# Patient Record
Sex: FEMALE | Race: ASIAN | Employment: UNEMPLOYED | URBAN - METROPOLITAN AREA
[De-identification: names, ages, dates, MRNs, and addresses within clinical notes are randomized per-mention and may not be internally consistent; named-entity substitution may affect disease eponyms.]

---

## 2020-09-22 ENCOUNTER — APPOINTMENT (OUTPATIENT)
Dept: GENERAL RADIOLOGY | Age: 30
End: 2020-09-22
Attending: EMERGENCY MEDICINE
Payer: COMMERCIAL

## 2020-09-22 ENCOUNTER — HOSPITAL ENCOUNTER (OUTPATIENT)
Age: 30
Setting detail: OBSERVATION
Discharge: HOME | End: 2020-09-23
Attending: EMERGENCY MEDICINE | Admitting: FAMILY MEDICINE
Payer: COMMERCIAL

## 2020-09-22 ENCOUNTER — APPOINTMENT (OUTPATIENT)
Dept: CT IMAGING | Age: 30
End: 2020-09-22
Attending: EMERGENCY MEDICINE
Payer: COMMERCIAL

## 2020-09-22 DIAGNOSIS — R50.9 FEVER, UNSPECIFIED FEVER CAUSE: ICD-10-CM

## 2020-09-22 DIAGNOSIS — R61 NIGHT SWEATS: ICD-10-CM

## 2020-09-22 DIAGNOSIS — R51.9 NONINTRACTABLE HEADACHE, UNSPECIFIED CHRONICITY PATTERN, UNSPECIFIED HEADACHE TYPE: ICD-10-CM

## 2020-09-22 DIAGNOSIS — D72.829 LEUKOCYTOSIS, UNSPECIFIED TYPE: ICD-10-CM

## 2020-09-22 DIAGNOSIS — R79.82 CRP ELEVATED: ICD-10-CM

## 2020-09-22 DIAGNOSIS — R00.0 SINUS TACHYCARDIA: Primary | ICD-10-CM

## 2020-09-22 DIAGNOSIS — M54.50 LOW BACK PAIN, UNSPECIFIED BACK PAIN LATERALITY, UNSPECIFIED CHRONICITY, UNSPECIFIED WHETHER SCIATICA PRESENT: ICD-10-CM

## 2020-09-22 LAB
ALBUMIN SERPL-MCNC: 3.6 G/DL (ref 3.5–5)
ALBUMIN/GLOB SERPL: 0.7 {RATIO} (ref 1.1–2.2)
ALP SERPL-CCNC: 120 U/L (ref 45–117)
ALT SERPL-CCNC: 22 U/L (ref 12–78)
ANION GAP SERPL CALC-SCNC: 12 MMOL/L (ref 5–15)
APPEARANCE UR: CLEAR
AST SERPL-CCNC: 17 U/L (ref 15–37)
BACTERIA URNS QL MICRO: NEGATIVE /HPF
BASOPHILS # BLD: 0.1 K/UL (ref 0–0.1)
BASOPHILS NFR BLD: 0 % (ref 0–1)
BILIRUB SERPL-MCNC: 0.7 MG/DL (ref 0.2–1)
BILIRUB UR QL: NEGATIVE
BUN SERPL-MCNC: 5 MG/DL (ref 6–20)
BUN/CREAT SERPL: 6 (ref 12–20)
CALCIUM SERPL-MCNC: 9.1 MG/DL (ref 8.5–10.1)
CHLORIDE SERPL-SCNC: 97 MMOL/L (ref 97–108)
CO2 SERPL-SCNC: 25 MMOL/L (ref 21–32)
COLOR UR: ABNORMAL
COMMENT, HOLDF: NORMAL
CREAT SERPL-MCNC: 0.78 MG/DL (ref 0.55–1.02)
CRP SERPL-MCNC: 53.01 MG/DL
D DIMER PPP FEU-MCNC: 0.48 MG/L FEU (ref 0–0.65)
DIFFERENTIAL METHOD BLD: ABNORMAL
EOSINOPHIL # BLD: 0 K/UL (ref 0–0.4)
EOSINOPHIL NFR BLD: 0 % (ref 0–7)
EPITH CASTS URNS QL MICRO: ABNORMAL /LPF
ERYTHROCYTE [DISTWIDTH] IN BLOOD BY AUTOMATED COUNT: 13.3 % (ref 11.5–14.5)
FERRITIN SERPL-MCNC: 81 NG/ML (ref 8–252)
FIBRINOGEN PPP-MCNC: 670 MG/DL (ref 200–475)
GLOBULIN SER CALC-MCNC: 4.9 G/DL (ref 2–4)
GLUCOSE SERPL-MCNC: 113 MG/DL (ref 65–100)
GLUCOSE UR STRIP.AUTO-MCNC: NEGATIVE MG/DL
HCG UR QL: NEGATIVE
HCT VFR BLD AUTO: 38.1 % (ref 35–47)
HGB BLD-MCNC: 12.7 G/DL (ref 11.5–16)
HGB UR QL STRIP: ABNORMAL
IMM GRANULOCYTES # BLD AUTO: 0.1 K/UL (ref 0–0.04)
IMM GRANULOCYTES NFR BLD AUTO: 1 % (ref 0–0.5)
KETONES UR QL STRIP.AUTO: NEGATIVE MG/DL
LACTATE SERPL-SCNC: 1 MMOL/L (ref 0.4–2)
LDH SERPL L TO P-CCNC: 166 U/L (ref 81–246)
LEUKOCYTE ESTERASE UR QL STRIP.AUTO: NEGATIVE
LYMPHOCYTES # BLD: 2.9 K/UL (ref 0.8–3.5)
LYMPHOCYTES NFR BLD: 21 % (ref 12–49)
MAGNESIUM SERPL-MCNC: 2 MG/DL (ref 1.6–2.4)
MCH RBC QN AUTO: 25.2 PG (ref 26–34)
MCHC RBC AUTO-ENTMCNC: 33.3 G/DL (ref 30–36.5)
MCV RBC AUTO: 75.6 FL (ref 80–99)
MONOCYTES # BLD: 2 K/UL (ref 0–1)
MONOCYTES NFR BLD: 14 % (ref 5–13)
NEUTS SEG # BLD: 9 K/UL (ref 1.8–8)
NEUTS SEG NFR BLD: 64 % (ref 32–75)
NITRITE UR QL STRIP.AUTO: NEGATIVE
NRBC # BLD: 0 K/UL (ref 0–0.01)
NRBC BLD-RTO: 0 PER 100 WBC
PH UR STRIP: 6.5 [PH] (ref 5–8)
PLATELET # BLD AUTO: 255 K/UL (ref 150–400)
PMV BLD AUTO: 10.8 FL (ref 8.9–12.9)
POTASSIUM SERPL-SCNC: 3.5 MMOL/L (ref 3.5–5.1)
PROCALCITONIN SERPL-MCNC: 0.13 NG/ML
PROT SERPL-MCNC: 8.5 G/DL (ref 6.4–8.2)
PROT UR STRIP-MCNC: NEGATIVE MG/DL
RBC # BLD AUTO: 5.04 M/UL (ref 3.8–5.2)
RBC #/AREA URNS HPF: ABNORMAL /HPF (ref 0–5)
SAMPLES BEING HELD,HOLD: NORMAL
SODIUM SERPL-SCNC: 134 MMOL/L (ref 136–145)
SP GR UR REFRACTOMETRY: <1.005 (ref 1–1.03)
TSH SERPL DL<=0.05 MIU/L-ACNC: 0.86 UIU/ML (ref 0.36–3.74)
UROBILINOGEN UR QL STRIP.AUTO: 0.2 EU/DL (ref 0.2–1)
WBC # BLD AUTO: 14 K/UL (ref 3.6–11)
WBC URNS QL MICRO: ABNORMAL /HPF (ref 0–4)

## 2020-09-22 PROCEDURE — 81001 URINALYSIS AUTO W/SCOPE: CPT

## 2020-09-22 PROCEDURE — 36415 COLL VENOUS BLD VENIPUNCTURE: CPT

## 2020-09-22 PROCEDURE — 85384 FIBRINOGEN ACTIVITY: CPT

## 2020-09-22 PROCEDURE — 87040 BLOOD CULTURE FOR BACTERIA: CPT

## 2020-09-22 PROCEDURE — 87635 SARS-COV-2 COVID-19 AMP PRB: CPT

## 2020-09-22 PROCEDURE — 81025 URINE PREGNANCY TEST: CPT

## 2020-09-22 PROCEDURE — 74011250636 HC RX REV CODE- 250/636: Performed by: INTERNAL MEDICINE

## 2020-09-22 PROCEDURE — 82728 ASSAY OF FERRITIN: CPT

## 2020-09-22 PROCEDURE — 96365 THER/PROPH/DIAG IV INF INIT: CPT

## 2020-09-22 PROCEDURE — 99285 EMERGENCY DEPT VISIT HI MDM: CPT

## 2020-09-22 PROCEDURE — 80053 COMPREHEN METABOLIC PANEL: CPT

## 2020-09-22 PROCEDURE — 83615 LACTATE (LD) (LDH) ENZYME: CPT

## 2020-09-22 PROCEDURE — 71046 X-RAY EXAM CHEST 2 VIEWS: CPT

## 2020-09-22 PROCEDURE — 83735 ASSAY OF MAGNESIUM: CPT

## 2020-09-22 PROCEDURE — 84145 PROCALCITONIN (PCT): CPT

## 2020-09-22 PROCEDURE — 85025 COMPLETE CBC W/AUTO DIFF WBC: CPT

## 2020-09-22 PROCEDURE — 74011000258 HC RX REV CODE- 258: Performed by: EMERGENCY MEDICINE

## 2020-09-22 PROCEDURE — 93005 ELECTROCARDIOGRAM TRACING: CPT

## 2020-09-22 PROCEDURE — 99218 HC RM OBSERVATION: CPT

## 2020-09-22 PROCEDURE — 74011250637 HC RX REV CODE- 250/637: Performed by: EMERGENCY MEDICINE

## 2020-09-22 PROCEDURE — 86140 C-REACTIVE PROTEIN: CPT

## 2020-09-22 PROCEDURE — 83605 ASSAY OF LACTIC ACID: CPT

## 2020-09-22 PROCEDURE — 84443 ASSAY THYROID STIM HORMONE: CPT

## 2020-09-22 PROCEDURE — 74011250636 HC RX REV CODE- 250/636: Performed by: EMERGENCY MEDICINE

## 2020-09-22 PROCEDURE — 0100U RESPIRATORY PANEL,PCR,NASOPHARYNGEAL: CPT

## 2020-09-22 PROCEDURE — 70450 CT HEAD/BRAIN W/O DYE: CPT

## 2020-09-22 PROCEDURE — 96368 THER/DIAG CONCURRENT INF: CPT

## 2020-09-22 PROCEDURE — 96366 THER/PROPH/DIAG IV INF ADDON: CPT

## 2020-09-22 PROCEDURE — 85379 FIBRIN DEGRADATION QUANT: CPT

## 2020-09-22 RX ORDER — ACETAMINOPHEN 650 MG/1
650 SUPPOSITORY RECTAL
Status: DISCONTINUED | OUTPATIENT
Start: 2020-09-22 | End: 2020-09-23 | Stop reason: HOSPADM

## 2020-09-22 RX ORDER — SODIUM CHLORIDE 9 MG/ML
100 INJECTION, SOLUTION INTRAVENOUS CONTINUOUS
Status: DISCONTINUED | OUTPATIENT
Start: 2020-09-22 | End: 2020-09-23

## 2020-09-22 RX ORDER — ACETAMINOPHEN 325 MG/1
650 TABLET ORAL
Status: DISCONTINUED | OUTPATIENT
Start: 2020-09-22 | End: 2020-09-23 | Stop reason: HOSPADM

## 2020-09-22 RX ORDER — IBUPROFEN 600 MG/1
600 TABLET ORAL
Status: COMPLETED | OUTPATIENT
Start: 2020-09-22 | End: 2020-09-22

## 2020-09-22 RX ORDER — ENOXAPARIN SODIUM 100 MG/ML
30 INJECTION SUBCUTANEOUS EVERY 12 HOURS
Status: DISCONTINUED | OUTPATIENT
Start: 2020-09-22 | End: 2020-09-22

## 2020-09-22 RX ORDER — ACETAMINOPHEN 500 MG
1000 TABLET ORAL ONCE
Status: DISCONTINUED | OUTPATIENT
Start: 2020-09-22 | End: 2020-09-22

## 2020-09-22 RX ORDER — SODIUM CHLORIDE 0.9 % (FLUSH) 0.9 %
5-10 SYRINGE (ML) INJECTION AS NEEDED
Status: DISCONTINUED | OUTPATIENT
Start: 2020-09-22 | End: 2020-09-23 | Stop reason: HOSPADM

## 2020-09-22 RX ADMIN — IBUPROFEN 600 MG: 600 TABLET, FILM COATED ORAL at 16:26

## 2020-09-22 RX ADMIN — PIPERACILLIN AND TAZOBACTAM 3.38 G: 3; .375 INJECTION, POWDER, LYOPHILIZED, FOR SOLUTION INTRAVENOUS at 16:21

## 2020-09-22 RX ADMIN — VANCOMYCIN HYDROCHLORIDE 1000 MG: 1 INJECTION, POWDER, LYOPHILIZED, FOR SOLUTION INTRAVENOUS at 16:55

## 2020-09-22 RX ADMIN — SODIUM CHLORIDE 1000 ML: 900 INJECTION, SOLUTION INTRAVENOUS at 13:14

## 2020-09-22 RX ADMIN — SODIUM CHLORIDE 1000 ML: 900 INJECTION, SOLUTION INTRAVENOUS at 13:13

## 2020-09-22 RX ADMIN — SODIUM CHLORIDE 100 ML/HR: 9 INJECTION, SOLUTION INTRAVENOUS at 23:46

## 2020-09-22 NOTE — ED PROVIDER NOTES
Normally healthy 26-year-old female had her flu shot a few days ago and has felt somewhat feverish and has some night sweats ever since. She had low back pain a few days ago, but that went away after taking some ibuprofen. [facility] and they noted her blood pressure was low at 514 systolic and her heart rate was high at 116 bpm.  They referred her here in suggested she might get some IV fluids. She has no history of diabetes or thyroid problems in her family. She has been drinking a lot and urinating a lot. She has not been coughing and has no trouble breathing, aside from when she wears the mask. No nausea or vomiting or diarrhea. No unintended weight loss. No skin or hair changes. No neck stiffness or neck pain or headaches. She has no back pain at all now. History reviewed. No pertinent past medical history. Past Surgical History:   Procedure Laterality Date    HX  SECTION           History reviewed. No pertinent family history.     Social History     Socioeconomic History    Marital status:      Spouse name: Not on file    Number of children: Not on file    Years of education: Not on file    Highest education level: Not on file   Occupational History    Not on file   Social Needs    Financial resource strain: Not on file    Food insecurity     Worry: Not on file     Inability: Not on file    Transportation needs     Medical: Not on file     Non-medical: Not on file   Tobacco Use    Smoking status: Never Smoker    Smokeless tobacco: Never Used   Substance and Sexual Activity    Alcohol use: Not Currently    Drug use: Not on file    Sexual activity: Not on file   Lifestyle    Physical activity     Days per week: Not on file     Minutes per session: Not on file    Stress: Not on file   Relationships    Social connections     Talks on phone: Not on file     Gets together: Not on file     Attends Holiness service: Not on file     Active member of club or organization: Not on file     Attends meetings of clubs or organizations: Not on file     Relationship status: Not on file    Intimate partner violence     Fear of current or ex partner: Not on file     Emotionally abused: Not on file     Physically abused: Not on file     Forced sexual activity: Not on file   Other Topics Concern    Not on file   Social History Narrative    Not on file         ALLERGIES: Sulfa (sulfonamide antibiotics)    Review of Systems   Constitutional: Positive for fatigue and fever. HENT: Negative for trouble swallowing. Eyes: Negative for visual disturbance. Respiratory: Negative for cough. Cardiovascular: Negative for chest pain. Gastrointestinal: Negative for abdominal pain. Genitourinary: Negative for difficulty urinating. Musculoskeletal: Negative for gait problem. Skin: Negative for rash. Neurological: Negative for headaches. Hematological: Does not bruise/bleed easily. Psychiatric/Behavioral: Negative for sleep disturbance. Vitals:    09/22/20 1220   BP: 125/72   Pulse: (!) 128   Resp: 16   Temp: 98.9 °F (37.2 °C)   Weight: 58.7 kg (129 lb 6.6 oz)   Height: 5' 3\" (1.6 m)            Physical Exam  Constitutional:       Appearance: Normal appearance. HENT:      Head: Normocephalic. Nose: Nose normal.      Mouth/Throat:      Mouth: Mucous membranes are moist.   Eyes:      Extraocular Movements: Extraocular movements intact. Conjunctiva/sclera: Conjunctivae normal.   Cardiovascular:      Rate and Rhythm: Tachycardia present. Pulmonary:      Effort: Pulmonary effort is normal. No respiratory distress. Abdominal:      General: Abdomen is flat. Palpations: Abdomen is soft. Tenderness: There is no abdominal tenderness. There is no right CVA tenderness, left CVA tenderness, guarding or rebound. Musculoskeletal: Normal range of motion. General: No tenderness. Skin:     Findings: No rash. Neurological:      General: No focal deficit present. Mental Status: She is alert. Psychiatric:         Behavior: Behavior normal.          MDM  Number of Diagnoses or Management Options  CRP elevated:   Fever, unspecified fever cause:   Leukocytosis, unspecified type:   Low back pain, unspecified back pain laterality, unspecified chronicity, unspecified whether sciatica present:   Night sweats:   Nonintractable headache, unspecified chronicity pattern, unspecified headache type:   Sinus tachycardia:   Diagnosis management comments: EKG at 1225 shows sinus tachycardia with normal axis at a rate of 132 bpm  TX, QRS, and QTc are all within normal limits  No clear-cut delta waves  No significant ST changes  She does have T wave inversions in leads III, aVF, and V3        Perfect Serve Consult for Admission  3:19 PM    ED Room Number: SER07/07  Patient Name and age:  Kirk Gordon 26 y.o.  female  Working Diagnosis: Sinus tachycardia  (primary encounter diagnosis)  Fever, unspecified fever cause  Nonintractable headache, unspecified chronicity pattern, unspecified headache type  Low back pain, unspecified back pain laterality, unspecified chronicity, unspecified whether sciatica present  CRP elevated  Leukocytosis, unspecified type  Night sweats    COVID-19 Suspicion:  yes  Sepsis present:  no  Reassessment needed: no  Code Status:  Full Code  Readmission: no  Isolation Requirements:  yes  Recommended Level of Care:  telemetry  Department:[facility] - [phone]  Other: Unexplained tachycardia with reported fevers at home and back pain. Patient does not want to pursue lumbar puncture at present, though I tried to let her know the importance of obtaining it. She may want it at a later date. No obvious source for her symptoms. Given her broad-spectrum antibiotics and have already given her a large fluid bolus.            Procedures

## 2020-09-22 NOTE — ED TRIAGE NOTES
TRIAGE NOTE: Back pain low grade fever x 2 days. [facility], said HR elevated, BP low (116, 100/50). Flu vaccine Friday.     Her complaint is fatigue

## 2020-09-22 NOTE — ED NOTES
TRANSFER - OUT REPORT:    Verbal report given to Carlyn Schreiber(name) on Viridiana Adames  being transferred to [facility] ED(unit) for routine progression of care       Report consisted of patients Situation, Background, Assessment and   Recommendations(SBAR). Information from the following report(s) SBAR, Kardex, ED Summary, [facility] and Cardiac Rhythm sinus tach was reviewed with the receiving nurse. Lines:   Peripheral IV 09/22/20 Left Antecubital (Active)        Opportunity for questions and clarification was provided.       Patient transported with:   Monitor  COVID precautions (PUI)

## 2020-09-23 VITALS
HEIGHT: 63 IN | RESPIRATION RATE: 23 BRPM | SYSTOLIC BLOOD PRESSURE: 107 MMHG | TEMPERATURE: 97.5 F | BODY MASS INDEX: 24.18 KG/M2 | HEART RATE: 96 BPM | OXYGEN SATURATION: 100 % | WEIGHT: 136.47 LBS | DIASTOLIC BLOOD PRESSURE: 61 MMHG

## 2020-09-23 PROBLEM — R51.9 HEADACHE: Status: RESOLVED | Noted: 2020-09-22 | Resolved: 2020-09-23

## 2020-09-23 LAB
ALBUMIN SERPL-MCNC: 2.6 G/DL (ref 3.5–5)
ALBUMIN/GLOB SERPL: 0.7 {RATIO} (ref 1.1–2.2)
ALP SERPL-CCNC: 104 U/L (ref 45–117)
ALT SERPL-CCNC: 18 U/L (ref 12–78)
ANION GAP SERPL CALC-SCNC: 8 MMOL/L (ref 5–15)
APTT PPP: 35.3 SEC (ref 22.1–32)
AST SERPL-CCNC: 8 U/L (ref 15–37)
ATRIAL RATE: 132 BPM
B PERT DNA SPEC QL NAA+PROBE: NOT DETECTED
BASOPHILS # BLD: 0.1 K/UL (ref 0–0.1)
BASOPHILS NFR BLD: 0 % (ref 0–1)
BILIRUB SERPL-MCNC: 0.3 MG/DL (ref 0.2–1)
BORDETELLA PARAPERTUSSIS PCR, BORPAR: NOT DETECTED
BUN SERPL-MCNC: 3 MG/DL (ref 6–20)
BUN/CREAT SERPL: 6 (ref 12–20)
C PNEUM DNA SPEC QL NAA+PROBE: NOT DETECTED
CALCIUM SERPL-MCNC: 8.3 MG/DL (ref 8.5–10.1)
CALCULATED P AXIS, ECG09: 55 DEGREES
CALCULATED R AXIS, ECG10: 14 DEGREES
CALCULATED T AXIS, ECG11: -26 DEGREES
CHLORIDE SERPL-SCNC: 110 MMOL/L (ref 97–108)
CO2 SERPL-SCNC: 22 MMOL/L (ref 21–32)
CREAT SERPL-MCNC: 0.48 MG/DL (ref 0.55–1.02)
CRP SERPL-MCNC: 16.6 MG/DL (ref 0–0.6)
D DIMER PPP FEU-MCNC: 0.32 MG/L FEU (ref 0–0.65)
DIAGNOSIS, 93000: NORMAL
DIFFERENTIAL METHOD BLD: ABNORMAL
EOSINOPHIL # BLD: 0.2 K/UL (ref 0–0.4)
EOSINOPHIL NFR BLD: 2 % (ref 0–7)
ERYTHROCYTE [DISTWIDTH] IN BLOOD BY AUTOMATED COUNT: 13.7 % (ref 11.5–14.5)
ERYTHROCYTE [SEDIMENTATION RATE] IN BLOOD: 48 MM/HR (ref 0–20)
FIBRINOGEN PPP-MCNC: 591 MG/DL (ref 200–475)
FLUAV H1 2009 PAND RNA SPEC QL NAA+PROBE: NOT DETECTED
FLUAV H1 RNA SPEC QL NAA+PROBE: NOT DETECTED
FLUAV H3 RNA SPEC QL NAA+PROBE: NOT DETECTED
FLUAV SUBTYP SPEC NAA+PROBE: NOT DETECTED
FLUBV RNA SPEC QL NAA+PROBE: NOT DETECTED
GLOBULIN SER CALC-MCNC: 3.9 G/DL (ref 2–4)
GLUCOSE SERPL-MCNC: 91 MG/DL (ref 65–100)
HADV DNA SPEC QL NAA+PROBE: NOT DETECTED
HCOV 229E RNA SPEC QL NAA+PROBE: NOT DETECTED
HCOV HKU1 RNA SPEC QL NAA+PROBE: NOT DETECTED
HCOV NL63 RNA SPEC QL NAA+PROBE: NOT DETECTED
HCOV OC43 RNA SPEC QL NAA+PROBE: NOT DETECTED
HCT VFR BLD AUTO: 32.8 % (ref 35–47)
HGB BLD-MCNC: 10.6 G/DL (ref 11.5–16)
HMPV RNA SPEC QL NAA+PROBE: NOT DETECTED
HPIV1 RNA SPEC QL NAA+PROBE: NOT DETECTED
HPIV2 RNA SPEC QL NAA+PROBE: NOT DETECTED
HPIV3 RNA SPEC QL NAA+PROBE: NOT DETECTED
HPIV4 RNA SPEC QL NAA+PROBE: NOT DETECTED
IMM GRANULOCYTES # BLD AUTO: 0.1 K/UL (ref 0–0.04)
IMM GRANULOCYTES NFR BLD AUTO: 1 % (ref 0–0.5)
INR PPP: 1.2 (ref 0.9–1.1)
LYMPHOCYTES # BLD: 2.2 K/UL (ref 0.8–3.5)
LYMPHOCYTES NFR BLD: 18 % (ref 12–49)
M PNEUMO DNA SPEC QL NAA+PROBE: NOT DETECTED
MCH RBC QN AUTO: 25 PG (ref 26–34)
MCHC RBC AUTO-ENTMCNC: 32.3 G/DL (ref 30–36.5)
MCV RBC AUTO: 77.4 FL (ref 80–99)
MONOCYTES # BLD: 1.6 K/UL (ref 0–1)
MONOCYTES NFR BLD: 14 % (ref 5–13)
NEUTS SEG # BLD: 7.8 K/UL (ref 1.8–8)
NEUTS SEG NFR BLD: 65 % (ref 32–75)
NRBC # BLD: 0 K/UL (ref 0–0.01)
NRBC BLD-RTO: 0 PER 100 WBC
P-R INTERVAL, ECG05: 180 MS
PLATELET # BLD AUTO: 228 K/UL (ref 150–400)
PMV BLD AUTO: 10.3 FL (ref 8.9–12.9)
POTASSIUM SERPL-SCNC: 3.5 MMOL/L (ref 3.5–5.1)
PROT SERPL-MCNC: 6.5 G/DL (ref 6.4–8.2)
PROTHROMBIN TIME: 11.8 SEC (ref 9–11.1)
Q-T INTERVAL, ECG07: 274 MS
QRS DURATION, ECG06: 70 MS
QTC CALCULATION (BEZET), ECG08: 405 MS
RBC # BLD AUTO: 4.24 M/UL (ref 3.8–5.2)
RSV RNA SPEC QL NAA+PROBE: NOT DETECTED
RV+EV RNA SPEC QL NAA+PROBE: NOT DETECTED
SODIUM SERPL-SCNC: 140 MMOL/L (ref 136–145)
THERAPEUTIC RANGE,PTTT: ABNORMAL SECS (ref 58–77)
VENTRICULAR RATE, ECG03: 132 BPM
WBC # BLD AUTO: 11.9 K/UL (ref 3.6–11)

## 2020-09-23 PROCEDURE — 85652 RBC SED RATE AUTOMATED: CPT

## 2020-09-23 PROCEDURE — 36415 COLL VENOUS BLD VENIPUNCTURE: CPT

## 2020-09-23 PROCEDURE — 85379 FIBRIN DEGRADATION QUANT: CPT

## 2020-09-23 PROCEDURE — 99218 HC RM OBSERVATION: CPT

## 2020-09-23 PROCEDURE — 74011250637 HC RX REV CODE- 250/637: Performed by: FAMILY MEDICINE

## 2020-09-23 PROCEDURE — 85610 PROTHROMBIN TIME: CPT

## 2020-09-23 PROCEDURE — 85730 THROMBOPLASTIN TIME PARTIAL: CPT

## 2020-09-23 PROCEDURE — 80053 COMPREHEN METABOLIC PANEL: CPT

## 2020-09-23 PROCEDURE — 85384 FIBRINOGEN ACTIVITY: CPT

## 2020-09-23 PROCEDURE — 85025 COMPLETE CBC W/AUTO DIFF WBC: CPT

## 2020-09-23 PROCEDURE — 86140 C-REACTIVE PROTEIN: CPT

## 2020-09-23 RX ADMIN — ACETAMINOPHEN 650 MG: 325 TABLET ORAL at 10:48

## 2020-09-23 RX ADMIN — ACETAMINOPHEN 650 MG: 325 TABLET ORAL at 01:01

## 2020-09-23 NOTE — ACP (ADVANCE CARE PLANNING)
Advance Care Planning     Advance Care Planning Activator (Inpatient)  Conversation Note      Date of ACP Conversation: 09/23/20     Conversation Conducted with:   Patient with Decision Making Capacity    ACP Activator: Lvear Wiggins RN    *When Decision Maker makes decisions on behalf of the incapacitated patient: Decision Maker is asked to consider and make decisions based on patient values, known preferences, or best interests. Health Care Decision Maker:    Current Designated Health Care Decision Maker:   Primary Decision Maker: aJmes Rota - [phone]  (If there is a valid Health Care Decision Maker named in the \"Healthcare Decision Makers\" box in the ACP activity, but it is not visible above, be sure to open that field and then select the health care decision maker relationship (ie \"primary\") in the blank space to the right of the name.) Validate  this information as still accurate & up-to-date; edit [place] field as needed.)    Note: Assess and validate information in current ACP documents, as indicated. If no Decision Maker listed above or available through scanned documents, then:    If no Authorized Decision Maker has previously been identified, then patient chooses [place]:  \"Who would you like to name as your primary health care decision-maker? \"        Note: If the relationship of these Decision-Makers to the patient does NOT follow your state's Next of Kin hierarchy, recommend that patient complete ACP document that meets state-specific requirements to allow them to act on the patient's behalf when appropriate. Care Preferences    Ventilation: \"If you were in your present state of health and suddenly became very ill and were unable to breathe on your own, what would your preference be about the use of a ventilator (breathing machine) if it were available to you? \"      If patient would desire the use of a ventilator (breathing machine), answer \"yes\", if not \"no\":yes    \"If your health worsens and it becomes clear that your chance of recovery is unlikely, what would your preference be about the use of a ventilator (breathing machine) if it were available to you? \"     Would the patient desire the use of a ventilator (breathing machine)? YES      Resuscitation  \"CPR works best to restart the heart when there is a sudden event, like a heart attack, in someone who is otherwise healthy. Unfortunately, CPR does not typically restart the heart for people who have serious health conditions or who are very sick. \"    \"In the event your heart stopped as a result of an underlying serious health condition, would you want attempts to be made to restart your heart (answer \"yes\" for attempt to resuscitate) or would you prefer a natural death (answer \"no\" for do not attempt to resuscitate)? \" yes      NOTE: If the patient has a valid advance directive AND now provides care preference(s) that are inconsistent with that prior directive, advise the patient to consider either: creating a new advance directive that complies with state-specific requirements; or, if that is not possible, orally revoking that prior directive in accordance with state-specific requirements, which must be documented in the EHR. [] Yes  [] No   Educated Patient / [facility] regarding differences between Advance Directives and portable DNR orders.     Length of ACP Conversation in minutes:  15  Conversation Outcomes:  [x] ACP discussion completed  [] Existing advance directive reviewed with patient; no changes to patient's previously recorded wishes     [] New Advance Directive completed   [] Portable Do Not Resuscitate prepared for Provider review and signature  [] POLST/POST/MOLST/MOST prepared for Provider review and signature      Follow-up plan:    [] Schedule follow-up conversation to continue planning  [] Referred individual to Provider for additional questions/concerns   [] Advised patient/agent/surrogate to review completed ACP document and update if needed with changes in condition, patient preferences or care setting     [] This note routed to one or more involved healthcare providers

## 2020-09-23 NOTE — PROGRESS NOTES
2040- Pt arrival to [facility]. Pt able to ambulate independently from stretcher to bed. Pt denies pain, shortness of breath, and nausea. Assessment completed upon patients arrival to unit and care assumed. Visit Vitals  /69   Pulse (!) 110   Temp 98.1 °F (36.7 °C)   Resp 17   Ht 5' 3\" (1.6 m)   Wt 59.6 kg (131 lb 6.3 oz)   SpO2 100%   BMI 23.28 kg/m²     2114- TRANSFER - IN REPORT:    Verbal report received from PHIL Young(name) on Macy Mort  being received from [place] ED(unit) for routine progression of care      Report consisted of patients Situation, Background, Assessment and   Recommendations(SBAR). Information from the following report(s) ED Summary was reviewed with the receiving nurse. Opportunity for questions and clarification was provided.       Primary Nurse Bandar Felix RN and YOUSIF MERIDA RN performed a dual skin assessment on this patient No impairment noted  Tra score is 23

## 2020-09-23 NOTE — ED NOTES
[facility] here to transport patient to [facility] ED. VSS. Respirations equal and unlabored on RA. IV saline locked. Patient in no acute distress upon transfer.

## 2020-09-23 NOTE — PROGRESS NOTES
0825- Bedside shift change report given to Suzie Wolfe RN (oncoming nurse) by Jian Muñoz RN (offgoing nurse). Report included the following information SBAR, Kardex, ED Summary, Intake/Output and Recent Results Cardiac Rhythm: NSR to Sinus Tach (110-116). Intake/Output Summary (Last 24 hours) at 9/23/2020 0843  Last data filed at 9/23/2020 0306  Gross per 24 hour   Intake 2933.33 ml   Output 750 ml   Net 2183.33 ml     Problem: Falls - Risk of  Goal: *Absence of Falls  Description: Document [place] Fall Risk and appropriate interventions in the flowsheet.   Outcome: Progressing Towards Goal  Note: Fall Risk Interventions:  Medication Interventions: Evaluate medications/consider consulting pharmacy     Problem: Fluid Volume - Risk of, Imbalanced  Goal: *Balanced intake and output  Outcome: Progressing Towards Goal  Note: Monitoring intake and output: hat placed in toilet to monitor output     Problem: General Infection Care Plan (Adult and Pediatric)  Goal: Improvement in signs and symptoms of infection  Outcome: Progressing Towards Goal  Note: Pt is afebrile, HR improved- currently 88 bpm

## 2020-09-23 NOTE — PROGRESS NOTES
8554- spoke with pt's , Korina Baker (Phone number: [phone]). Updates given. He stated that the pt has had a history of elevated WBC counts at her baseline and she has been referred to a hematologist in the past for this. He could not recall the name of the hematologist they saw recently.

## 2020-09-23 NOTE — DISCHARGE INSTRUCTIONS
Follow CDC recommendations for COVID infection til you have info that your test is normal      Else follow CDC rec for household member w covid infection. ( if positive results)     Call [phone] to ask for result of covid test tomorrow 9/24.      Need:   -Follow-up blood culture -- usuall held for study  for five days so call back on blood cultures on Friday 9/25  and again on Monday of coming week   -Follow-up COVID-19 test as well     9/23/2020   Lina Farrell MD

## 2020-09-23 NOTE — ED NOTES
TRANSFER - OUT REPORT:    Verbal report given to Valentín Anderson RN (name) on [facility]  being transferred to [facility] (unit) for routine progression of care       Report consisted of patients Situation, Background, Assessment and   Recommendations(SBAR). Information from the following report(s) SBAR, ED Summary, Intake/Output and Recent Results was reviewed with the receiving nurse. Lines:   Peripheral IV 09/22/20 Left Antecubital (Active)        Opportunity for questions and clarification was provided.       Patient transported with:   BENNY

## 2020-09-23 NOTE — H&P
[address] [facility] Adult  Hospitalist Group  History and Physical    Primary Care Provider: None  Date of Service:  9/22/2020    Subjective:     Shawn Chávez is a 26 y.o. female with past medical history significant for elevated WBC presented to the emergency room complaining of elevated heart rate. Patient states that for the last 2 days she has been experiencing night sweats and feeling feverish. Has not taken temperature at home. However, symptoms worsened today to the point that she woke up with a mild headache and lower back pain. She states that she got the flu shot 3 days ago and her symptoms started after getting the flu shot. Given worsening symptoms she decided to come to urgent care clinic for evaluation. In the urgent care clinic she was found to have a heart rate of 116 sinus tachycardia. Referred to the emergency room for further evaluation. Work-up in the ER overall unremarkable except for elevation and nonspecific inflammatory marker. She was found to have a heart rate in the 120s. Given IV fluids with improvement of the heart rate. Given concerns for her headache and reported fever she had a CT scan of the head that was unremarkable. She was recommended lumbar puncture but she refused. Patient reports complete resolution of her symptoms, denies headache, shortness of breath, chest pain, palpitation, nausea, vomiting, or lower back pain. Given persistent tachycardia  admission was requested. Review of Systems:    Review of Systems   Constitutional: Positive for fever and malaise/fatigue. Negative for chills and weight loss. HENT: Negative for congestion, ear discharge and hearing loss. Eyes: Negative for blurred vision and double vision. Respiratory: Negative for cough, sputum production, shortness of breath and wheezing. Cardiovascular: Negative for chest pain, palpitations, orthopnea, leg swelling and PND.    Gastrointestinal: Negative for abdominal pain, constipation, diarrhea, melena, nausea and vomiting. Genitourinary: Negative for dysuria, frequency and urgency. Musculoskeletal: Positive for back pain and myalgias. Negative for joint pain. Skin: Negative for itching and rash. Neurological: Positive for headaches. Negative for dizziness, sensory change, speech change, focal weakness and weakness. Endo/Heme/Allergies: Negative for polydipsia. Does not bruise/bleed easily. Past medical history:  Chronic elevated WBC    Past Surgical History:   Procedure Laterality Date    HX  SECTION       Home medication: None    Allergies   Allergen Reactions    Sulfa (Sulfonamide Antibiotics) Rash      History reviewed. No pertinent family history. SOCIAL HISTORY:  Patient resides at home. Patient ambulates independently. Smoking history: None. Alcohol history: None. Objective:       Physical Exam:   Visit Vitals  /69   Pulse (!) 110   Temp 98.1 °F (36.7 °C)   Resp 17   Ht 5' 3\" (1.6 m)   Wt 59.6 kg (131 lb 6.3 oz)   SpO2 100%   BMI 23.28 kg/m²     GEN APPEARANCE: Patient resting in bed in NAD  HEENT: Conjunctiva Clear  CVS: Tachycardia. No M/G/R  LUNGS: CTAB; No Wheezes; No Rhonchi: No rales  ABD: Soft; No TTP; + Normoactive BS  EXT: WWP, no edema   Skin exam: No gross lesions noted on exposed skin surfaces  MENTAL STATUS: Answers questions appropriately, responds to commands. Neuro: Cranial nerve exam: EOMI, CN V sensory/motor intact, no facial asymmetry noted, patient able to hearl, uvula midline, able to move tongue left/right. No gross motor or sensory deficits      Data Review:   Recent Results (from the past 24 hour(s))   SAMPLES BEING HELD    Collection Time: 20 12:30 PM   Result Value Ref Range    SAMPLES BEING HELD 1RED, 1BLUE, 1PST, 1LAV     COMMENT        Add-on orders for these samples will be processed based on acceptable specimen integrity and analyte stability, which may vary by analyte.    C REACTIVE PROTEIN, QT    Collection Time: 09/22/20 12:30 PM   Result Value Ref Range    C-Reactive protein 53.01 (H) <0.60 mg/dL   CBC WITH AUTOMATED DIFF    Collection Time: 09/22/20 12:30 PM   Result Value Ref Range    WBC 14.0 (H) 3.6 - 11.0 K/uL    RBC 5.04 3.80 - 5.20 M/uL    HGB 12.7 11.5 - 16.0 g/dL    HCT 38.1 35.0 - 47.0 %    MCV 75.6 (L) 80.0 - 99.0 FL    MCH 25.2 (L) 26.0 - 34.0 PG    MCHC 33.3 30.0 - 36.5 g/dL    RDW 13.3 11.5 - 14.5 %    PLATELET 280 009 - 715 K/uL    MPV 10.8 8.9 - 12.9 FL    NRBC 0.0 0  WBC    ABSOLUTE NRBC 0.00 0.00 - 0.01 K/uL    NEUTROPHILS 64 32 - 75 %    LYMPHOCYTES 21 12 - 49 %    MONOCYTES 14 (H) 5 - 13 %    EOSINOPHILS 0 0 - 7 %    BASOPHILS 0 0 - 1 %    IMMATURE GRANULOCYTES 1 (H) 0.0 - 0.5 %    ABS. NEUTROPHILS 9.0 (H) 1.8 - 8.0 K/UL    ABS. LYMPHOCYTES 2.9 0.8 - 3.5 K/UL    ABS. MONOCYTES 2.0 (H) 0.0 - 1.0 K/UL    ABS. EOSINOPHILS 0.0 0.0 - 0.4 K/UL    ABS. BASOPHILS 0.1 0.0 - 0.1 K/UL    ABS. IMM.  GRANS. 0.1 (H) 0.00 - 0.04 K/UL    DF AUTOMATED     FERRITIN    Collection Time: 09/22/20 12:30 PM   Result Value Ref Range    Ferritin 81 8 - 252 NG/ML   FIBRINOGEN    Collection Time: 09/22/20 12:30 PM   Result Value Ref Range    Fibrinogen 670 (H) 200 - 475 mg/dL   LD    Collection Time: 09/22/20 12:30 PM   Result Value Ref Range     81 - 246 U/L   MAGNESIUM    Collection Time: 09/22/20 12:30 PM   Result Value Ref Range    Magnesium 2.0 1.6 - 2.4 mg/dL   METABOLIC PANEL, COMPREHENSIVE    Collection Time: 09/22/20 12:30 PM   Result Value Ref Range    Sodium 134 (L) 136 - 145 mmol/L    Potassium 3.5 3.5 - 5.1 mmol/L    Chloride 97 97 - 108 mmol/L    CO2 25 21 - 32 mmol/L    Anion gap 12 5 - 15 mmol/L    Glucose 113 (H) 65 - 100 mg/dL    BUN 5 (L) 6 - 20 MG/DL    Creatinine 0.78 0.55 - 1.02 MG/DL    BUN/Creatinine ratio 6 (L) 12 - 20      GFR est AA >60 >60 ml/min/1.73m2    GFR est non-AA >60 >60 ml/min/1.73m2    Calcium 9.1 8.5 - 10.1 MG/DL    Bilirubin, total 0.7 0.2 - 1.0 MG/DL    ALT (SGPT) 22 12 - 78 U/L    AST (SGOT) 17 15 - 37 U/L    Alk. phosphatase 120 (H) 45 - 117 U/L    Protein, total 8.5 (H) 6.4 - 8.2 g/dL    Albumin 3.6 3.5 - 5.0 g/dL    Globulin 4.9 (H) 2.0 - 4.0 g/dL    A-G Ratio 0.7 (L) 1.1 - 2.2     PROCALCITONIN    Collection Time: 09/22/20 12:30 PM   Result Value Ref Range    Procalcitonin 0.13 ng/mL   URINALYSIS W/MICROSCOPIC    Collection Time: 09/22/20  1:14 PM   Result Value Ref Range    Color YELLOW/STRAW      Appearance CLEAR CLEAR      Specific gravity <1.005 1.003 - 1.030    pH (UA) 6.5 5.0 - 8.0      Protein Negative NEG mg/dL    Glucose Negative NEG mg/dL    Ketone Negative NEG mg/dL    Bilirubin Negative NEG      Blood SMALL (A) NEG      Urobilinogen 0.2 0.2 - 1.0 EU/dL    Nitrites Negative NEG      Leukocyte Esterase Negative NEG      WBC 0-4 0 - 4 /hpf    RBC 0-5 0 - 5 /hpf    Epithelial cells FEW FEW /lpf    Bacteria Negative NEG /hpf   TSH 3RD GENERATION    Collection Time: 09/22/20  1:14 PM   Result Value Ref Range    TSH 0.86 0.36 - 3.74 uIU/mL   LACTIC ACID    Collection Time: 09/22/20  1:14 PM   Result Value Ref Range    Lactic acid 1.0 0.4 - 2.0 MMOL/L   HCG URINE, QL. - POC    Collection Time: 09/22/20  1:20 PM   Result Value Ref Range    Pregnancy test,urine (POC) Negative NEG     D DIMER    Collection Time: 09/22/20  1:39 PM   Result Value Ref Range    D-dimer 0.48 0.00 - 0.65 mg/L FEU   SARS-COV-2    Collection Time: 09/22/20  4:27 PM   Result Value Ref Range    Specimen source Nasopharyngeal      SARS-CoV-2 PENDING     SARS-CoV-2 PENDING     Specimen source Nasopharyngeal      COVID-19 rapid test PENDING     Specimen type NP Swab      Health status PENDING     COVID-19 PENDING        Assessment:     Active Problems:    Headache (9/22/2020)        Plan:     1. Back pain: Associated with headache. Now resolved. Could be secondary to viral syndrome. No focus of infection at this time. She does have some leukocytosis but no left shift.   She reports h/o chronic leukocytosis unknown baseline  - Received dose of  Vancomycin and Zosyn in the emergency room for fever of unknown origin however patient has been afebrile since arrival to the emergency room. Will hold antibiotic for now and monitor as no clear source of infection and patient asymptomatic at this time. - initial symptoms could be related to viral syndrome versus side effect of flu vaccine. If recurrent fever then will restart antibiotic.  -Check respiratory PCR. -Follow-up blood culture.  -Follow-up COVID-19 test.     2.  Tachycardia: Sinus tachycardia. D-dimer negative. Could be secondary to poor p.o. intake.  -We will continue with IV fluid.  -Cardiac telemetry. 3.  Leukocytosis: Elevated WBC of 14,000. No baseline WBC per patient states that he follows with hematology for chronic leukocytosis. Not on any therapy. Unknown baseline to her. No left shift noted today. Will monitor for now. DVT prophylaxis: SCDs. CODE STATUS: Full code  Plan discussed with patient.      FUNCTIONAL STATUS PRIOR TO HOSPITALIZATION Ambulates Independently     Signed By: Saul Brush MD     September 22, 2020

## 2020-09-23 NOTE — PROGRESS NOTES
GALI:    RUR N/A    Patient will go home with her , Zahra Poag ([phone]) when discharged. Care Management Interventions  PCP Verified by CM: No(New PCP assigned at [facility].)  Mode of Transport at Discharge: ( will transport.)  MyChart Signup: No  Discharge Durable Medical Equipment: No  Physical Therapy Consult: No  Occupational Therapy Consult: No  Speech Therapy Consult: No  Current Support Network: Lives with Spouse  Confirm Follow Up Transport: Family  Discharge Location  Discharge Placement: Home with family assistance    Reason for Admission:   Fever                   RUR Score:    N/A                 Plan for utilizing home health:      No orders    PCP: First and Last name:  Shoshana Anderson NP   Name of Practice: [facility]   Are you a current patient: Yes/No: New   Approximate date of last visit: New patient   Can you participate in a virtual visit with your PCP: Yes                    Current Advanced Directive/Advance Care Plan:            Not on file-Does not have. Transition of Care Plan:                    CM met with patient to introduce CM role, verify demographics and begin discharge planning. Patient lives in a 2 story house with her . She is independent at home and does not use any DME. Patient gets her prescriptions filled at [facility].    Insurance verified: [employer] [facility]. Patient's  will take her home at discharge.     Jose Hicks RN/CRM

## 2020-09-23 NOTE — ROUTINE PROCESS
New patient Hospital follow-up PCP transitional care appointment has been scheduled with ISRAEL Pires for Carol, Sept 28 @ 9AM. Pending patient discharge.   Maximilian Hernandez, Care Management Specialist.

## 2020-09-23 NOTE — DISCHARGE SUMMARY
Discharge Summary       PATIENT ID: Viridiana Adames  MRN: [id number]   YOB: 1990    DATE OF ADMISSION: 9/22/2020 12:14 PM    DATE OF DISCHARGE: 9/23/2020   PRIMARY CARE PROVIDER: None     ATTENDING PHYSICIAN: Sharon Olea MD   DISCHARGING PROVIDER: Sharon Olea MD    To contact this individual call [phone] and ask the  to page. If unavailable ask to be transferred the Adult Hospitalist Department. CONSULTATIONS: None    PROCEDURES/SURGERIES: * No surgery found *    ADMITTING DIAGNOSES & HOSPITAL COURSE:     Pt does not feel sick, is eating, vs nl  Markers dramtically  Improved over night w/o interventions  Pt to call for results of covid and else follow CDC recs for covid + family member  Pt to call for results to hospitalist office in am  [phone] in am for results     . Back pain: Associated with headache. Now resolved. Could be secondary to viral syndrome. No focus of infection at this time. She does have some leukocytosis but no left shift. She reports h/o chronic leukocytosis unknown baseline  - Received dose of  Vancomycin and Zosyn in the emergency room for fever of unknown origin however patient has been afebrile since arrival to the emergency room. Will hold antibiotic for now and monitor as no clear source of infection and patient asymptomatic at this time. - initial symptoms could be related to viral syndrome versus side effect of flu vaccine. If recurrent fever then will restart antibiotic.  -Check respiratory PCR. -Follow-up blood culture.  -Follow-up COVID-19 test.   Markers down ; vs stable      2. Tachycardia: Sinus tachycardia. D-dimer negative. Could be secondary to poor p.o. intake.  -We will continue with IV fluid.  -Cardiac telemetry. - resolved      3. Leukocytosis: Elevated WBC of 14,000. No baseline WBC per patient states that he follows with hematology for chronic leukocytosis. Not on any therapy. Unknown baseline to her.   No left shift noted today. Will monitor for now. Improved        DISCHARGE DIAGNOSES / PLAN:      1.  as above      ADDITIONAL CARE RECOMMENDATIONS:   Call for f/u info on :     -Follow-up blood culture.  -Follow-up COVID-19 test.     PENDING TEST RESULTS:   At the time of discharge the following test results are still pending: blood cult and covid test     FOLLOW UP APPOINTMENTS:    Follow-up Information     Follow up With Specialties Details Why Contact Nacho Doherty NP Nurse Practitioner, Family Medicine Go on 9/28/2020 New patient hospital follow up appt has been scheduled for Mon, Sept 28 @ 9AM. Please arrive 30mins early, bring your insurance card, photo ID, discharge paperwork and current list of medications. , As needed [address] [facility] [address]  [place] [address] [phone]               DIET: Regular Diet     ACTIVITY: Activity as tolerated    WOUND CARE: na    EQUIPMENT needed: na      DISCHARGE MEDICATIONS:  There are no discharge medications for this patient. NOTIFY YOUR PHYSICIAN FOR ANY OF THE FOLLOWING:   Fever over 101 degrees for 24 hours. Chest pain, shortness of breath, fever, chills, nausea, vomiting, diarrhea, change in mentation, falling, weakness, bleeding. Severe pain or pain not relieved by medications. Or, any other signs or symptoms that you may have questions about. DISPOSITION:    Home With:   OT  PT  HH  RN       Long term SNF/Inpatient Rehab   x Independent/assisted living    Hospice    Other:       PATIENT CONDITION AT DISCHARGE:     Functional status    Poor     Deconditioned    x Independent      Cognition   x  Lucid     Forgetful     Dementia      Catheters/lines (plus indication)    Kaminski     PICC     PEG    x None      Code status   x  Full code     DNR      PHYSICAL EXAMINATION AT DISCHARGE:  General:          Alert, cooperative, no distress, appears stated age.      HEENT:           Atraumatic, anicteric sclerae, pink conjunctivae No oral ulcers, mucosa moist, throat clear, dentition fair  Neck:               Supple, symmetrical  Lungs:             Clear to auscultation bilaterally. No Wheezing or Rhonchi. No rales. Chest wall:      No tenderness  No Accessory muscle use. Heart:              Regular  rhythm,  No  murmur   No edema  Abdomen:        Soft, non-tender. Not distended. Bowel sounds normal  Extremities:     No cyanosis. No clubbing,                            Skin turgor normal, Capillary refill normal  Skin:                Not pale. Not Jaundiced  No rashes   Psych:             Not anxious or agitated.   Neurologic:      Alert, moves all extremities, answers questions appropriately and responds to commands       CHRONIC MEDICAL DIAGNOSES:  Problem List as of 9/23/2020 Date Reviewed: 9/23/2020          Codes Class Noted - Resolved    RESOLVED: Headache ICD-10-CM: R51  ICD-9-CM: 784.0  9/22/2020 - 9/23/2020              Greater than 15  minutes were spent with the patient on counseling and coordination of care    Signed:   Ranjeet Bardales MD  9/23/2020  10:20 AM

## 2020-09-23 NOTE — PROGRESS NOTES
Problem: Falls - Risk of  Goal: *Absence of Falls  Description: Document Cindy Fay Fall Risk and appropriate interventions in the flowsheet. Outcome: Progressing Towards Goal  Pt remains free of falls during admission. Call bell and frequently used items within reach. Bedside table within reach. Pt provided nonskid socks and instructed to call out for nurse when needing assistance. Problem: General Infection Care Plan (Adult and Pediatric)  Goal: Improvement in signs and symptoms of infection  Outcome: Progressing Towards Goal       1145: RN going over discharge summary with pt.     1200: Pt discharged.

## 2020-09-24 LAB
HEALTH STATUS, XMCV2T: NORMAL
SARS-COV-2, COV2NT: NOT DETECTED
SOURCE, COVRS: NORMAL
SPECIMEN SOURCE, FCOV2M: NORMAL
SPECIMEN TYPE, XMCV1T: NORMAL

## 2020-09-27 LAB
BACTERIA SPEC CULT: NORMAL
SERVICE CMNT-IMP: NORMAL

## 2020-09-28 ENCOUNTER — OFFICE VISIT (OUTPATIENT)
Dept: PRIMARY CARE CLINIC | Age: 30
End: 2020-09-28
Payer: COMMERCIAL

## 2020-09-28 VITALS
BODY MASS INDEX: 22.61 KG/M2 | TEMPERATURE: 98.2 F | RESPIRATION RATE: 16 BRPM | WEIGHT: 127.6 LBS | HEART RATE: 79 BPM | OXYGEN SATURATION: 100 % | HEIGHT: 63 IN | SYSTOLIC BLOOD PRESSURE: 102 MMHG | DIASTOLIC BLOOD PRESSURE: 59 MMHG

## 2020-09-28 DIAGNOSIS — Z97.5 IUD (INTRAUTERINE DEVICE) IN PLACE: ICD-10-CM

## 2020-09-28 DIAGNOSIS — Z01.89 ROUTINE LAB DRAW: ICD-10-CM

## 2020-09-28 DIAGNOSIS — D72.829 LEUKOCYTOSIS, UNSPECIFIED TYPE: Primary | ICD-10-CM

## 2020-09-28 DIAGNOSIS — E55.9 VITAMIN D DEFICIENCY: ICD-10-CM

## 2020-09-28 DIAGNOSIS — Z13.220 SCREENING FOR HYPERLIPIDEMIA: ICD-10-CM

## 2020-09-28 DIAGNOSIS — Z76.89 ENCOUNTER TO ESTABLISH CARE: ICD-10-CM

## 2020-09-28 PROCEDURE — 99203 OFFICE O/P NEW LOW 30 MIN: CPT | Performed by: NURSE PRACTITIONER

## 2020-09-28 NOTE — PROGRESS NOTES
[facility]   [address], [address] [facility]  P: [phone]  F: [phone]      CAROLYN Charles is a 26 y.o. female who presents to clinic for Establish Care (patient presents today to establish care with PCP) and Hospital Follow Up (she also presents as a hospital follow up s/p headaches). Reports she is doing well, no further headaches. She states she was kept in the hospital for 24 hours for fever and elevated heart rate. She states she was told she was dehydrated, but her COVID test and and lab work was fine. She does wonder today whether having her flu shot on  prior to symptoms developing had anything to do with fever and elevated heart rate. She states today she does have a prior history of persistent leukocytosis. She states she was seen by hematologist  previously and told she did not need further follow-up. She has an OB/GYN set up and currently has an IUD in place. She denies any other family or personal past medical history. She states her  works for [employer]. There are no active problems to display for this patient. History reviewed. No pertinent past medical history.   Past Surgical History:   Procedure Laterality Date    HX  SECTION       Social History     Socioeconomic History    Marital status:      Spouse name: Not on file    Number of children: Not on file    Years of education: Not on file    Highest education level: Not on file   Occupational History    Not on file   Social Needs    Financial resource strain: Not on file    Food insecurity     Worry: Not on file     Inability: Not on file    Transportation needs     Medical: Not on file     Non-medical: Not on file   Tobacco Use    Smoking status: Never Smoker    Smokeless tobacco: Never Used   Substance and Sexual Activity    Alcohol use: Not Currently    Drug use: Not on file    Sexual activity: Not on file   Lifestyle    Physical activity     Days per week: Not on file     Minutes per session: Not on file    Stress: Not on file   Relationships    Social connections     Talks on phone: Not on file     Gets together: Not on file     Attends Mosque service: Not on file     Active member of club or organization: Not on file     Attends meetings of clubs or organizations: Not on file     Relationship status: Not on file    Intimate partner violence     Fear of current or ex partner: Not on file     Emotionally abused: Not on file     Physically abused: Not on file     Forced sexual activity: Not on file   Other Topics Concern    Not on file   Social History Narrative    Not on file     No family history on file. Allergies   Allergen Reactions    Sulfa (Sulfonamide Antibiotics) Rash     The medications were reviewed and updated in the medical record. The past medical history, past surgical history, and family history were reviewed and updated in the medical record. REVIEW OF SYSTEMS   Review of Systems   Constitutional: Negative for malaise/fatigue. HENT: Negative for congestion. Eyes: Negative for blurred vision and pain. Respiratory: Negative for cough and shortness of breath. Cardiovascular: Negative for chest pain and palpitations. Gastrointestinal: Negative for abdominal pain and heartburn. Genitourinary: Negative for frequency and urgency. Musculoskeletal: Negative for joint pain and myalgias. Neurological: Negative for dizziness, tingling, sensory change, weakness and headaches. Psychiatric/Behavioral: Negative for depression, memory loss and substance abuse. PHYSICAL EXAM     Visit Vitals  BP (!) 102/59 (BP 1 Location: Left arm, BP Patient Position: Sitting)   Pulse 79   Temp 98.2 °F (36.8 °C) (Temporal)   Resp 16   Ht 5' 3\" (1.6 m)   Wt 127 lb 9.6 oz (57.9 kg)   LMP 09/15/2020   SpO2 100%   BMI 22.60 kg/m²       Physical Exam  Vitals signs and nursing note reviewed.    HENT:      Head: Normocephalic and atraumatic. Right Ear: External ear normal.      Left Ear: External ear normal.   Cardiovascular:      Rate and Rhythm: Normal rate and regular rhythm. Heart sounds: Normal heart sounds. Pulmonary:      Effort: Pulmonary effort is normal.      Breath sounds: Normal breath sounds. Musculoskeletal: Normal range of motion. Skin:     General: Skin is warm and dry. Neurological:      Mental Status: She is alert and oriented to person, place, and time. Gait: Gait is intact. Psychiatric:         Mood and Affect: Affect normal.         Judgment: Judgment normal.       ASSESSMENT/ PLAN   Diagnoses and all orders for this visit:    1. Leukocytosis, unspecified type  -     CBC W/O DIFF; Future    2. Screening for hyperlipidemia  -     LIPID PANEL; Future    3. Vitamin D deficiency  -     VITAMIN D, 25 HYDROXY; Future    4. Routine lab draw  -     CBC W/O DIFF; Future  -     METABOLIC PANEL, COMPREHENSIVE; Future  -     LIPID PANEL; Future  -     VITAMIN D, 25 HYDROXY; Future    5. Encounter to establish care    6. IUD (intrauterine device) in place      Disclaimer:  Advised patient to call back or return to office if symptoms worsen/change/persist.  Discussed expected course/resolution/complications of diagnosis in detail with patient.     Medication risks/benefits/alternatives discussed with patient. Patient was given an after visit summary which includes diagnoses, current medications, & vitals.      Discussed patient instructions and advised to read to all patient instructions regarding care.      Patient expressed understanding with the diagnosis and plan. This note will not be viewable in [address]         Bonnie Gonsalez NP  9/28/2020        (This document has been electronically signed)

## 2020-09-28 NOTE — PROGRESS NOTES
Chief Complaint   Patient presents with   [facility].     patient presents today to establish care with PCP   [facility] Follow Up     she also presents as a hospital follow up s/p headaches

## 2020-09-29 LAB
25(OH)D3 SERPL-MCNC: 21 NG/ML (ref 30–100)
ALBUMIN SERPL-MCNC: 3.6 G/DL (ref 3.5–5)
ALBUMIN/GLOB SERPL: 0.9 {RATIO} (ref 1.1–2.2)
ALP SERPL-CCNC: 98 U/L (ref 45–117)
ALT SERPL-CCNC: 15 U/L (ref 12–78)
ANION GAP SERPL CALC-SCNC: 7 MMOL/L (ref 5–15)
AST SERPL-CCNC: 9 U/L (ref 15–37)
BASOPHILS # BLD: 0.1 K/UL (ref 0–0.1)
BASOPHILS NFR BLD: 1 % (ref 0–1)
BILIRUB SERPL-MCNC: 0.3 MG/DL (ref 0.2–1)
BUN SERPL-MCNC: 6 MG/DL (ref 6–20)
BUN/CREAT SERPL: 9 (ref 12–20)
CALCIUM SERPL-MCNC: 9.4 MG/DL (ref 8.5–10.1)
CHLORIDE SERPL-SCNC: 104 MMOL/L (ref 97–108)
CHOLEST SERPL-MCNC: 174 MG/DL
CO2 SERPL-SCNC: 29 MMOL/L (ref 21–32)
CREAT SERPL-MCNC: 0.66 MG/DL (ref 0.55–1.02)
DIFFERENTIAL METHOD BLD: ABNORMAL
EOSINOPHIL # BLD: 0.4 K/UL (ref 0–0.4)
EOSINOPHIL NFR BLD: 3 % (ref 0–7)
ERYTHROCYTE [DISTWIDTH] IN BLOOD BY AUTOMATED COUNT: 14.7 % (ref 11.5–14.5)
GLOBULIN SER CALC-MCNC: 4 G/DL (ref 2–4)
GLUCOSE SERPL-MCNC: 78 MG/DL (ref 65–100)
HCT VFR BLD AUTO: 41.4 % (ref 35–47)
HDLC SERPL-MCNC: 32 MG/DL
HDLC SERPL: 5.4 {RATIO} (ref 0–5)
HGB BLD-MCNC: 12.5 G/DL (ref 11.5–16)
IMM GRANULOCYTES # BLD AUTO: 0 K/UL (ref 0–0.04)
IMM GRANULOCYTES NFR BLD AUTO: 0 % (ref 0–0.5)
LDLC SERPL CALC-MCNC: 110.8 MG/DL (ref 0–100)
LIPID PROFILE,FLP: ABNORMAL
LYMPHOCYTES # BLD: 3.9 K/UL (ref 0.8–3.5)
LYMPHOCYTES NFR BLD: 29 % (ref 12–49)
MCH RBC QN AUTO: 24.7 PG (ref 26–34)
MCHC RBC AUTO-ENTMCNC: 30.2 G/DL (ref 30–36.5)
MCV RBC AUTO: 81.8 FL (ref 80–99)
MONOCYTES # BLD: 0.7 K/UL (ref 0–1)
MONOCYTES NFR BLD: 5 % (ref 5–13)
NEUTS SEG # BLD: 8.2 K/UL (ref 1.8–8)
NEUTS SEG NFR BLD: 62 % (ref 32–75)
NRBC # BLD: 0 K/UL (ref 0–0.01)
NRBC BLD-RTO: 0 PER 100 WBC
PLATELET # BLD AUTO: 644 K/UL (ref 150–400)
PLATELET COMMENTS,PCOM: ABNORMAL
PMV BLD AUTO: 9.7 FL (ref 8.9–12.9)
POTASSIUM SERPL-SCNC: 4 MMOL/L (ref 3.5–5.1)
PROT SERPL-MCNC: 7.6 G/DL (ref 6.4–8.2)
RBC # BLD AUTO: 5.06 M/UL (ref 3.8–5.2)
RBC MORPH BLD: ABNORMAL
SODIUM SERPL-SCNC: 140 MMOL/L (ref 136–145)
TRIGL SERPL-MCNC: 156 MG/DL (ref ?–150)
VLDLC SERPL CALC-MCNC: 31.2 MG/DL
WBC # BLD AUTO: 13.3 K/UL (ref 3.6–11)

## 2020-09-30 DIAGNOSIS — D69.6 THROMBOCYTOPENIA (HCC): ICD-10-CM

## 2020-09-30 DIAGNOSIS — D72.829 LEUKOCYTOSIS, UNSPECIFIED TYPE: Primary | ICD-10-CM

## 2020-09-30 NOTE — PROGRESS NOTES
Please call let her know that she has low vitamin D, triglyceride and LDL cholesterol are slightly elevated, and her white blood cell count and platelets are elevated. I recommend a daily vitamin D supplement, continue to work on diet and exercise for cholesterol. Would like her to follow-up with hematology to discuss her white blood cells and platelet count being high.   Have her call [phone]

## 2020-11-23 NOTE — PROGRESS NOTES
[address] [facility] at [facility]  [phone]    Hematology / Oncology Consult    Reason for Visit:   Celestina Andrade is a 31 y.o. female who is seen by synchronous (real-time) audio-video technology on 2020 in consultation at the request of ISRAEL Ramirez for evaluation of thrombocytosis. History of Present Illness:   Celestina Andrade is a 31 y.o. female who is seen for evaluation of thrombocytosis. Patient was seen in the ER in late 2020 for elevated HR, night sweats few days after receiving flu shot. Work-up in the ER was unremarkable aside from sinus tachycardia which improved with IVF and inflammatory markers which trended down by next day after IV antibiotics. Follow up visit a week later at PCP's office was notable for PLT count of 644. Patient denies h/o thrombosis. No recent surgeries, wounds, infections. Pt states her WBC count has been elevated for the past 5-6 years. He saw a hematologist in the past, who said overall workup was normal. Dr. Bryan Sanchez saw her in the past for leukocytosis. No thrombocytosis in the past. Taking MVN periodically. No use of corticosteroids. Nonsmoker. No chronic rashes. No chronic medical problems requiring medications. No family h/o blood disorders. History reviewed. No pertinent past medical history. Past Surgical History:   Procedure Laterality Date    HX  SECTION        Social History     Tobacco Use    Smoking status: Never Smoker    Smokeless tobacco: Never Used   Substance Use Topics    Alcohol use: Not Currently      No family history on file. No current outpatient medications on file. No current facility-administered medications for this visit. Allergies   Allergen Reactions    Sulfa (Sulfonamide Antibiotics) Rash        Review of Systems: A complete review of systems was obtained, negative except as described above.     Physical Exam:       Due to this being a TeleHealth evaluation, many elements of the physical examination are unable to be assessed. Constitutional: Appears well-developed and well-nourished in no apparent distress   Mental status: Alert and awake, Oriented to person/place/time, Able to follow commands  Eyes: EOM normal, Sclera normal, No visible ocular discharge  HENT: Normocephalic, atraumatic; Mouth/Throat: Moist mucous membranes, External Ears normal  Neck: No visualized mass  Pulmonary/Chest: Respiratory effort normal, No visualized signs of difficulty breathing or respiratory distress   Musculoskeletal: Normal gait with no signs of ataxia, Normal range of motion of neck  Neurological: No facial asymmetry (Cranial nerve 7 motor function), No gaze palsy  Skin: No significant exanthematous lesions or discoloration noted on facial skin  Psychiatric: Normal affect, normal judgment/insight. No hallucinations         Results:     Lab Results   Component Value Date/Time    WBC 13.3 (H) 09/28/2020 10:00 AM    HGB 12.5 09/28/2020 10:00 AM    HCT 41.4 09/28/2020 10:00 AM    PLATELET 358 (H) 94/20/2197 10:00 AM    MCV 81.8 09/28/2020 10:00 AM    ABS. NEUTROPHILS 8.2 (H) 09/28/2020 10:00 AM     Lab Results   Component Value Date/Time    Sodium 140 09/28/2020 10:00 AM    Potassium 4.0 09/28/2020 10:00 AM    Chloride 104 09/28/2020 10:00 AM    CO2 29 09/28/2020 10:00 AM    Glucose 78 09/28/2020 10:00 AM    BUN 6 09/28/2020 10:00 AM    Creatinine 0.66 09/28/2020 10:00 AM    GFR est AA >60 09/28/2020 10:00 AM    GFR est non-AA >60 09/28/2020 10:00 AM    Calcium 9.4 09/28/2020 10:00 AM     Lab Results   Component Value Date/Time    Bilirubin, total 0.3 09/28/2020 10:00 AM    ALT (SGPT) 15 09/28/2020 10:00 AM    Alk.  phosphatase 98 09/28/2020 10:00 AM    Protein, total 7.6 09/28/2020 10:00 AM    Albumin 3.6 09/28/2020 10:00 AM    Globulin 4.0 09/28/2020 10:00 AM     Lab Results   Component Value Date/Time    Ferritin 81 09/22/2020 12:30 PM       No results found for: B12LT, FOL, RBCF  Lab Results   Component Value Date/Time    TSH 0.86 09/22/2020 01:14 PM     No results found for: HAMAT, HAAB, HABT, HAAT, HBSAG, HBSB, HBSAT, HBABN, HBCM, HBCAB, HBCAT, XBCABS, HBEAB, HBEAG, XHEPCS, [id number], [facility], HBCLT, HBEBLT, [id number], [id number], [address] [place] [address], [id number], HBCMLT, [id number], HCGAT      Imaging:     Radiology report(s) reviewed. Assessment & Plan:   Nadiya Trejo is a 31 y.o. female is seen for thrombocytosis and leukocytosis. 1. Thrombocytosis:   Given patient provided history of chronic leukocytosis for several years, now with new but concurrent thrombocytosis, I do recommend further workup for myeloproliferative disorders. Will recheck CBC with diff and peripheral smear today, as well as JAK2, CALR, MPL and bcr/abl. These results can take 2 weeks to return. -- CBC with diff, peripheral smear, JAK2/CALR/MPL, bcr/abl  -- Return in 4 weeks for virtual visit to review results    2. Leukocytosis (neutrophilia):   Chronic per patient for several years. Was previously seen by Dr. Tierra Hillman in Hematology approx 5-6 yrs ago with no findings. Will recheck now. 3. Sinus tachycardia:   Symptoms have resolved to baseline per patient. I appreciate the opportunity to participate in Ms. Lelia Ledbetter's care. I was in the office while conducting this encounter. The patient was at her at home    Consent:  She and/or her healthcare decision maker is aware that this patient-initiated Telehealth encounter is a billable service, with coverage as determined by her insurance carrier.  She is aware that she may receive a bill and has provided verbal consent to proceed: Yes    Pursuant to the emergency declaration under the [address] and the [facility], [address] waiver authority and the [employer] Act, this Virtual  Visit was conducted, with patient's (and/or legal guardian's) consent, to reduce the patient's risk of exposure to COVID-19 and provide necessary medical care. Services were provided through a video synchronous discussion virtually to substitute for in-person visit.       Signed By: Omar Moon MD     November 24, 2020

## 2020-11-24 ENCOUNTER — VIRTUAL VISIT (OUTPATIENT)
Dept: ONCOLOGY | Age: 30
End: 2020-11-24
Payer: COMMERCIAL

## 2020-11-24 DIAGNOSIS — D72.828 CHRONIC NEUTROPHILIA: ICD-10-CM

## 2020-11-24 DIAGNOSIS — R00.0 SINUS TACHYCARDIA: ICD-10-CM

## 2020-11-24 DIAGNOSIS — D75.839 THROMBOCYTOSIS: Primary | ICD-10-CM

## 2020-11-24 PROCEDURE — 99244 OFF/OP CNSLTJ NEW/EST MOD 40: CPT | Performed by: INTERNAL MEDICINE

## 2020-11-24 NOTE — PROGRESS NOTES
Chief Complaint   Patient presents with   [address]     Celestina Andrade is a pleasant 31year old woman who presents today as a new patient referred by ISRAEL Ramirez to establish care for low platelets.  She denies any pain at this time

## 2020-11-27 DIAGNOSIS — D75.839 THROMBOCYTOSIS: ICD-10-CM

## 2020-11-27 DIAGNOSIS — D72.828 CHRONIC NEUTROPHILIA: ICD-10-CM

## 2020-12-09 LAB
BACKGROUND: 489207: NORMAL
DIRECTOR REVIEW: 489204: NORMAL
JAK2 P.V617F BLD/T QL: NORMAL
REFLEX: NORMAL

## 2020-12-10 LAB
BASOPHILS # BLD: 0.1 K/UL (ref 0–0.1)
BASOPHILS NFR BLD: 1 % (ref 0–1)
CELLS ANALYZED: 200
CELLS COUNTED: 200
DIFFERENTIAL METHOD BLD: ABNORMAL
DIRECTOR REVIEW: NORMAL
EOSINOPHIL # BLD: 0.5 K/UL (ref 0–0.4)
EOSINOPHIL NFR BLD: 4 % (ref 0–7)
ERYTHROCYTE [DISTWIDTH] IN BLOOD BY AUTOMATED COUNT: 14.5 % (ref 11.5–14.5)
FISH RESULT, BCRF4T: NORMAL
HCT VFR BLD AUTO: 40.6 % (ref 35–47)
HGB BLD-MCNC: 13.2 G/DL (ref 11.5–16)
IMM GRANULOCYTES # BLD AUTO: 0 K/UL (ref 0–0.04)
IMM GRANULOCYTES NFR BLD AUTO: 0 % (ref 0–0.5)
INTERPRETATION: NORMAL
LYMPHOCYTES # BLD: 4.2 K/UL (ref 0.8–3.5)
LYMPHOCYTES NFR BLD: 37 % (ref 12–49)
MCH RBC QN AUTO: 25.9 PG (ref 26–34)
MCHC RBC AUTO-ENTMCNC: 32.5 G/DL (ref 30–36.5)
MCV RBC AUTO: 79.8 FL (ref 80–99)
MONOCYTES # BLD: 0.6 K/UL (ref 0–1)
MONOCYTES NFR BLD: 5 % (ref 5–13)
NEUTS SEG # BLD: 6 K/UL (ref 1.8–8)
NEUTS SEG NFR BLD: 53 % (ref 32–75)
NRBC # BLD: 0 K/UL (ref 0–0.01)
NRBC BLD-RTO: 0 PER 100 WBC
PERIPHERAL SMEAR,PSM: NORMAL
PLATELET # BLD AUTO: 443 K/UL (ref 150–400)
PMV BLD AUTO: 10.3 FL (ref 8.9–12.9)
RBC # BLD AUTO: 5.09 M/UL (ref 3.8–5.2)
SPECIMEN TYPE: NORMAL
WBC # BLD AUTO: 11.4 K/UL (ref 3.6–11)

## 2021-01-28 LAB
BACKGROUND, CALR2T: NORMAL
CALR MUTATION DETECTION RESULT, CALR1T: NORMAL
EXTRACTION: NORMAL
LAB DIRECTOR NAME PROVIDER: NORMAL
LAB DIRECTOR NAME PROVIDER: NORMAL
MPL GENE MUT TESTED BLD/T: NORMAL
MPL MUTATION ANALYSIS RESULT: NORMAL
REF LAB TEST METHOD: NORMAL
REFERENCES, CALR4T: NORMAL
REFERENCES, MPLM6T: NORMAL
SERVICE CMNT-IMP: NORMAL

## 2021-04-20 DIAGNOSIS — D75.839 THROMBOCYTOSIS: Primary | ICD-10-CM

## 2021-04-20 DIAGNOSIS — D72.828 CHRONIC NEUTROPHILIA: ICD-10-CM

## 2021-05-19 ENCOUNTER — PATIENT MESSAGE (OUTPATIENT)
Dept: ONCOLOGY | Age: 31
End: 2021-05-19

## 2021-05-25 NOTE — PROGRESS NOTES
[id number] [facility] at [facility]  [phone]    Hematology / Oncology Established Visit    Reason for Visit:   Mariaelena Borjas is a 31 y.o. female who is seen by synchronous (real-time) audio-video technology on 2021 for follow up of thrombocytosis. History of Present Illness:   Mariaelena Borjas is a 31 y.o. female who is seen for evaluation of thrombocytosis. Patient was seen in the ER in late 2020 for elevated HR, night sweats few days after receiving flu shot. Work-up in the ER was unremarkable aside from sinus tachycardia which improved with IVF and inflammatory markers which trended down by next day after IV antibiotics. Follow up visit a week later at PCP's office was notable for PLT count of 644. Patient denies h/o thrombosis. No recent surgeries, wounds, infections. Pt states her WBC count has been elevated for the past 5-6 years. He saw a hematologist in the past, who said overall workup was normal. Dr. Brook Moyer saw her in the past for leukocytosis. No thrombocytosis in the past. Taking MVN periodically. No use of corticosteroids. Nonsmoker. No chronic rashes. No chronic medical problems requiring medications. No family h/o blood disorders. Interval History:  Pt is feeling well overall. No infections, fevers, chills recently. No heavy menstrual periods, has IUD in place. Denies heavy menses. States today's appt was rescheduled by her a few times. PMHx: None  PSurgHx:   SHx: Never smoker, no EtOH. . FHx: No h/o VTE or malignancy in family      Review of Systems: A complete review of systems was obtained, negative except as described above. Physical Exam:       Due to this being a TeleHealth evaluation, many elements of the physical examination are unable to be assessed.      Constitutional: Appears well-developed and well-nourished in no apparent distress   Mental status: Alert and awake, Oriented to person/place/time, Able to follow commands  Eyes: EOM normal, Sclera normal, No visible ocular discharge  HENT: Normocephalic, atraumatic; Mouth/Throat: Moist mucous membranes, External Ears normal  Neck: No visualized mass  Pulmonary/Chest: Respiratory effort normal, No visualized signs of difficulty breathing or respiratory distress   Musculoskeletal: Normal gait with no signs of ataxia, Normal range of motion of neck  Neurological: No facial asymmetry (Cranial nerve 7 motor function), No gaze palsy  Skin: No significant exanthematous lesions or discoloration noted on facial skin  Psychiatric: Normal affect, normal judgment/insight. No hallucinations         Results:     Lab Results   Component Value Date/Time    WBC 11.8 (H) 01/18/2021 02:08 PM    HGB 12.8 01/18/2021 02:08 PM    HCT 38.9 01/18/2021 02:08 PM    PLATELET 596 46/07/8231 02:08 PM    MCV 81.7 01/18/2021 02:08 PM    ABS. NEUTROPHILS 6.9 01/18/2021 02:08 PM     Lab Results   Component Value Date/Time    Sodium 140 09/28/2020 10:00 AM    Potassium 4.0 09/28/2020 10:00 AM    Chloride 104 09/28/2020 10:00 AM    CO2 29 09/28/2020 10:00 AM    Glucose 78 09/28/2020 10:00 AM    BUN 6 09/28/2020 10:00 AM    Creatinine 0.66 09/28/2020 10:00 AM    GFR est AA >60 09/28/2020 10:00 AM    GFR est non-AA >60 09/28/2020 10:00 AM    Calcium 9.4 09/28/2020 10:00 AM     Lab Results   Component Value Date/Time    Bilirubin, total 0.3 09/28/2020 10:00 AM    ALT (SGPT) 15 09/28/2020 10:00 AM    Alk.  phosphatase 98 09/28/2020 10:00 AM    Protein, total 7.6 09/28/2020 10:00 AM    Albumin 3.6 09/28/2020 10:00 AM    Globulin 4.0 09/28/2020 10:00 AM     Lab Results   Component Value Date/Time    Iron 79 01/18/2021 02:08 PM    TIBC 331 01/18/2021 02:08 PM    Iron % saturation 24 01/18/2021 02:08 PM    Ferritin 23 01/18/2021 02:08 PM       No results found for: B12LT, FOL, RBCF  Lab Results   Component Value Date/Time    TSH 0.86 09/22/2020 01:14 PM     No results found for: HAMAT, HAAB, HABT, HAAT, HBSAG, HBSB, HBSAT, HBABN, HBCM, HBCAB, HBCAT, Yaritza Ape, [address], [address], [place], [id number], [address], HBCMLT, HBCLT, HBEBLT, [id number], [id number], HAVMLT, [id number], HBCMLT, [id number], HCGAT    Peripheral smear:  Mild leukocytosis with normal morphology   Mild thrombocytosis   Mild microcytic hypochromic erythrocytes     Imaging:     Radiology report(s) reviewed. Assessment & Plan:   Selam Cerda is a 31 y.o. female is seen for thrombocytosis and leukocytosis. 1. H/o Thrombocytosis:   Given patient provided history of chronic leukocytosis for several years, now with new concurrent thrombocytosis, I tested for for myeloproliferative disorders- JAK2, CALR, MPL and bcr/abl all negative. Thrombocytosis improved to normal range per labs in Jan 2021. Was likely related to iron deficiency. -- No further workup needed    2. Leukocytosis (neutrophilia):   Chronic per patient for several years. Was previously seen by Dr. Millie Jose in Hematology approx 5-6 yrs ago with no findings. 3. H/o Microcytosis without anemia:  Improved. 3. Sinus tachycardia:   Symptoms have resolved to baseline per patient. I appreciate the opportunity to participate in Ms. Jessica Ledbetter's care. Total physician time spent on this encounter was 30 minutes. The patient was evaluated through a synchronous (real-time) audio-video encounter. The patient (or guardian if applicable) is aware that this is a billable service. Verbal consent to proceed has been obtained within the past 12 months. The visit was conducted pursuant to the emergency declaration under the [address], [facility] waiver authority and the [employer] and [employer] Act. Patient identification was verified, and a caregiver was present when appropriate. The patient was located in a state where the provider was credentialed to provide care.       Signed By: Mellissa Hewitt Kvng Blas MD     May 27, 2021

## 2021-05-27 ENCOUNTER — VIRTUAL VISIT (OUTPATIENT)
Dept: ONCOLOGY | Age: 31
End: 2021-05-27
Payer: COMMERCIAL

## 2021-05-27 DIAGNOSIS — D75.839 THROMBOCYTOSIS: ICD-10-CM

## 2021-05-27 DIAGNOSIS — D72.828 CHRONIC NEUTROPHILIA: Primary | ICD-10-CM

## 2021-05-27 DIAGNOSIS — R71.8 MICROCYTOSIS: ICD-10-CM

## 2021-05-27 PROCEDURE — 99214 OFFICE O/P EST MOD 30 MIN: CPT | Performed by: INTERNAL MEDICINE

## 2021-05-27 NOTE — PROGRESS NOTES
Chief Complaint   Patient presents with    Follow-up     Amy Ray is a pleasant 31year old woman who presents as a follow up for thrombocytosis.  She denies pain